# Patient Record
Sex: MALE | Race: BLACK OR AFRICAN AMERICAN | NOT HISPANIC OR LATINO | Employment: STUDENT | URBAN - METROPOLITAN AREA
[De-identification: names, ages, dates, MRNs, and addresses within clinical notes are randomized per-mention and may not be internally consistent; named-entity substitution may affect disease eponyms.]

---

## 2024-11-26 ENCOUNTER — APPOINTMENT (OUTPATIENT)
Dept: PRIMARY CARE | Facility: CLINIC | Age: 24
End: 2024-11-26
Payer: MEDICAID

## 2025-01-30 ENCOUNTER — APPOINTMENT (OUTPATIENT)
Dept: PRIMARY CARE | Facility: CLINIC | Age: 25
End: 2025-01-30
Payer: MEDICAID

## 2025-01-30 VITALS
HEART RATE: 56 BPM | HEIGHT: 69 IN | WEIGHT: 210 LBS | DIASTOLIC BLOOD PRESSURE: 75 MMHG | BODY MASS INDEX: 31.1 KG/M2 | SYSTOLIC BLOOD PRESSURE: 121 MMHG | TEMPERATURE: 98.2 F

## 2025-01-30 DIAGNOSIS — Z00.00 ANNUAL PHYSICAL EXAM: ICD-10-CM

## 2025-01-30 DIAGNOSIS — E55.9 VITAMIN D DEFICIENCY: ICD-10-CM

## 2025-01-30 DIAGNOSIS — F12.90 MARIJUANA USE: ICD-10-CM

## 2025-01-30 DIAGNOSIS — J45.20 MILD INTERMITTENT ASTHMA WITHOUT COMPLICATION (HHS-HCC): Primary | ICD-10-CM

## 2025-01-30 PROCEDURE — 1036F TOBACCO NON-USER: CPT | Performed by: INTERNAL MEDICINE

## 2025-01-30 PROCEDURE — 3008F BODY MASS INDEX DOCD: CPT | Performed by: INTERNAL MEDICINE

## 2025-01-30 PROCEDURE — 99385 PREV VISIT NEW AGE 18-39: CPT | Performed by: INTERNAL MEDICINE

## 2025-01-30 RX ORDER — ALBUTEROL SULFATE 90 UG/1
2 INHALANT RESPIRATORY (INHALATION) EVERY 6 HOURS PRN
Qty: 18 G | Refills: 1 | Status: SHIPPED | OUTPATIENT
Start: 2025-01-30

## 2025-01-30 RX ORDER — ALBUTEROL SULFATE 90 UG/1
2 INHALANT RESPIRATORY (INHALATION) EVERY 6 HOURS PRN
COMMUNITY
End: 2025-01-30 | Stop reason: SDUPTHER

## 2025-01-30 ASSESSMENT — ENCOUNTER SYMPTOMS
CHILLS: 0
DYSURIA: 0
FATIGUE: 0
FREQUENCY: 0
BACK PAIN: 0
NERVOUS/ANXIOUS: 0
MYALGIAS: 0
COUGH: 0
SLEEP DISTURBANCE: 0
SORE THROAT: 0
ARTHRALGIAS: 0
CONSTIPATION: 0
SINUS PAIN: 0
DIARRHEA: 0
ABDOMINAL PAIN: 0
HEADACHES: 0
WEAKNESS: 0
BLOOD IN STOOL: 0
NECK PAIN: 0
SHORTNESS OF BREATH: 0
PALPITATIONS: 0
FEVER: 0
DIZZINESS: 0

## 2025-01-30 NOTE — PROGRESS NOTES
"Subjective   Patient ID: Gabriel Vasquez is a 24 y.o. male who presents for New Patient Visit (Pt present today to [facility]. chucky).    CHARLY Rios is a 24-year-old male who is seen today for [facility] care.  He has been following up with his nutrition for several years.  He has mild asthma and uses albuterol inhaler occasionally.  He is in good health.  For last 1 year he has tried to change his diet and has lost more than 100 pounds.  He exercises regularly at the gym.  He lives with his mother.  He works as a  in the gym  He is sexually active with females.  He uses condoms.    Review of Systems   Constitutional:  Negative for chills, fatigue and fever.   HENT:  Negative for congestion, sinus pain and sore throat.    Respiratory:  Negative for cough and shortness of breath.    Cardiovascular:  Negative for chest pain, palpitations and leg swelling.   Gastrointestinal:  Negative for abdominal pain, blood in stool, constipation and diarrhea.   Genitourinary:  Negative for dysuria, frequency, penile discharge, penile pain and testicular pain.   Musculoskeletal:  Negative for arthralgias, back pain, myalgias and neck pain.   Neurological:  Negative for dizziness, weakness and headaches.   Psychiatric/Behavioral:  Negative for behavioral problems and sleep disturbance. The patient is not nervous/anxious.        Objective   /75 (BP Location: Right arm, Patient Position: Sitting)   Pulse 56   Temp 36.8 °C (98.2 °F)   Ht 1.753 m (5' 9\")   Wt 95.3 kg (210 lb)   BMI 31.01 kg/m²     Physical Exam  Vitals reviewed.   Constitutional:       General: He is not in acute distress.     Appearance: Normal appearance.   HENT:      Head: Normocephalic and atraumatic.      Right Ear: Tympanic membrane and ear canal normal.      Left Ear: Tympanic membrane and ear canal normal.      Mouth/Throat:      Mouth: Mucous membranes are moist.   Cardiovascular:      Rate and Rhythm: Normal rate and regular rhythm.      Pulses: " Normal pulses.   Pulmonary:      Effort: Pulmonary effort is normal. No respiratory distress.      Breath sounds: Normal breath sounds.   Abdominal:      General: Bowel sounds are normal. There is no distension.      Tenderness: There is no abdominal tenderness.   Musculoskeletal:         General: No swelling or tenderness. Normal range of motion.      Cervical back: Normal range of motion.   Skin:     General: Skin is warm.   Neurological:      General: No focal deficit present.      Mental Status: He is alert.      Coordination: Coordination normal.      Gait: Gait normal.   Psychiatric:         Mood and Affect: Mood normal.         Behavior: Behavior normal.         Assessment/Plan   Diagnoses and all orders for this visit:  Mild intermittent asthma without complication ([facility])  Comments:  Is intermittent  Continue albuterol inhaler as needed  Orders:  -     albuterol 90 mcg/actuation inhaler; Inhale 2 puffs every 6 hours if needed for wheezing.  Vitamin D deficiency  Comments:  Check vitamin D level  Start vitamin D supplement  Orders:  -     Vitamin D 25-Hydroxy,Total (for eval of Vitamin D levels); Future  Annual physical exam  Comments:  Exam and establishing care  Blood work ordered  Orders:  -     CBC; Future  -     Basic Metabolic Panel; Future  -     Lipid Panel; Future  -     TSH with reflex to Free T4 if abnormal; Future  -     Hemoglobin A1C; Future  Marijuana use  Comments:  Frequent marijuana use-encouraged patient to cut back    Follow-up as needed or in 1 year